# Patient Record
Sex: MALE | Race: OTHER | Employment: STUDENT | URBAN - METROPOLITAN AREA
[De-identification: names, ages, dates, MRNs, and addresses within clinical notes are randomized per-mention and may not be internally consistent; named-entity substitution may affect disease eponyms.]

---

## 2019-11-01 ENCOUNTER — APPOINTMENT (EMERGENCY)
Dept: RADIOLOGY | Facility: HOSPITAL | Age: 19
End: 2019-11-01
Payer: COMMERCIAL

## 2019-11-01 ENCOUNTER — HOSPITAL ENCOUNTER (EMERGENCY)
Facility: HOSPITAL | Age: 19
Discharge: HOME | End: 2019-11-01
Attending: EMERGENCY MEDICINE | Admitting: EMERGENCY MEDICINE
Payer: COMMERCIAL

## 2019-11-01 VITALS
SYSTOLIC BLOOD PRESSURE: 117 MMHG | HEART RATE: 96 BPM | DIASTOLIC BLOOD PRESSURE: 66 MMHG | OXYGEN SATURATION: 98 % | RESPIRATION RATE: 16 BRPM | TEMPERATURE: 97.9 F

## 2019-11-01 DIAGNOSIS — R07.89 ATYPICAL CHEST PAIN: Primary | ICD-10-CM

## 2019-11-01 LAB
ANION GAP SERPL CALCULATED.3IONS-SCNC: 4 MMOL/L (ref 4–13)
ATRIAL RATE: 96 BPM
BASOPHILS # BLD AUTO: 0.02 THOUSANDS/ΜL (ref 0–0.1)
BASOPHILS NFR BLD AUTO: 0 % (ref 0–1)
BUN SERPL-MCNC: 16 MG/DL (ref 5–25)
CALCIUM SERPL-MCNC: 9.7 MG/DL (ref 8.3–10.1)
CHLORIDE SERPL-SCNC: 104 MMOL/L (ref 100–108)
CO2 SERPL-SCNC: 28 MMOL/L (ref 21–32)
CREAT SERPL-MCNC: 1.07 MG/DL (ref 0.6–1.3)
EOSINOPHIL # BLD AUTO: 0.06 THOUSAND/ΜL (ref 0–0.61)
EOSINOPHIL NFR BLD AUTO: 1 % (ref 0–6)
ERYTHROCYTE [DISTWIDTH] IN BLOOD BY AUTOMATED COUNT: 12.3 % (ref 11.6–15.1)
GFR SERPL CREATININE-BSD FRML MDRD: 99 ML/MIN/1.73SQ M
GLUCOSE SERPL-MCNC: 88 MG/DL (ref 65–140)
HCT VFR BLD AUTO: 47.7 % (ref 36.5–49.3)
HGB BLD-MCNC: 16.5 G/DL (ref 12–17)
IMM GRANULOCYTES # BLD AUTO: 0.01 THOUSAND/UL (ref 0–0.2)
IMM GRANULOCYTES NFR BLD AUTO: 0 % (ref 0–2)
LYMPHOCYTES # BLD AUTO: 1.84 THOUSANDS/ΜL (ref 0.6–4.47)
LYMPHOCYTES NFR BLD AUTO: 28 % (ref 14–44)
MCH RBC QN AUTO: 29.9 PG (ref 26.8–34.3)
MCHC RBC AUTO-ENTMCNC: 34.6 G/DL (ref 31.4–37.4)
MCV RBC AUTO: 87 FL (ref 82–98)
MONOCYTES # BLD AUTO: 0.69 THOUSAND/ΜL (ref 0.17–1.22)
MONOCYTES NFR BLD AUTO: 11 % (ref 4–12)
NEUTROPHILS # BLD AUTO: 3.86 THOUSANDS/ΜL (ref 1.85–7.62)
NEUTS SEG NFR BLD AUTO: 60 % (ref 43–75)
NRBC BLD AUTO-RTO: 0 /100 WBCS
P AXIS: 72 DEGREES
PLATELET # BLD AUTO: 192 THOUSANDS/UL (ref 149–390)
PMV BLD AUTO: 10.4 FL (ref 8.9–12.7)
POTASSIUM SERPL-SCNC: 3.8 MMOL/L (ref 3.5–5.3)
PR INTERVAL: 126 MS
QRS AXIS: 106 DEGREES
QRSD INTERVAL: 90 MS
QT INTERVAL: 338 MS
QTC INTERVAL: 427 MS
RBC # BLD AUTO: 5.51 MILLION/UL (ref 3.88–5.62)
SODIUM SERPL-SCNC: 136 MMOL/L (ref 136–145)
T WAVE AXIS: 57 DEGREES
TROPONIN I SERPL-MCNC: <0.02 NG/ML
VENTRICULAR RATE: 96 BPM
WBC # BLD AUTO: 6.48 THOUSAND/UL (ref 4.31–10.16)

## 2019-11-01 PROCEDURE — 93010 ELECTROCARDIOGRAM REPORT: CPT | Performed by: INTERNAL MEDICINE

## 2019-11-01 PROCEDURE — 99285 EMERGENCY DEPT VISIT HI MDM: CPT

## 2019-11-01 PROCEDURE — 99285 EMERGENCY DEPT VISIT HI MDM: CPT | Performed by: EMERGENCY MEDICINE

## 2019-11-01 PROCEDURE — 71046 X-RAY EXAM CHEST 2 VIEWS: CPT

## 2019-11-01 PROCEDURE — 80048 BASIC METABOLIC PNL TOTAL CA: CPT | Performed by: EMERGENCY MEDICINE

## 2019-11-01 PROCEDURE — 85025 COMPLETE CBC W/AUTO DIFF WBC: CPT | Performed by: EMERGENCY MEDICINE

## 2019-11-01 PROCEDURE — 84484 ASSAY OF TROPONIN QUANT: CPT | Performed by: EMERGENCY MEDICINE

## 2019-11-01 PROCEDURE — 36415 COLL VENOUS BLD VENIPUNCTURE: CPT | Performed by: EMERGENCY MEDICINE

## 2019-11-01 PROCEDURE — 93005 ELECTROCARDIOGRAM TRACING: CPT

## 2019-11-01 NOTE — ED ATTENDING ATTESTATION
11/1/2019  Zoraida Horowitz DO, saw and evaluated the patient  I have discussed the patient with the resident/non-physician practitioner and agree with the resident's/non-physician practitioner's findings, Plan of Care, and MDM as documented in the resident's/non-physician practitioner's note, except where noted  All available labs and Radiology studies were reviewed  I was present for key portions of any procedure(s) performed by the resident/non-physician practitioner and I was immediately available to provide assistance  At this point I agree with the current assessment done in the Emergency Department  I have conducted an independent evaluation of this patient a history and physical is as follows:    19 yo male c/o L axillary chest pain intermittently starting last week  Has been doing more upper extremity exercises at the gym over the last couple weeks as well  Constant, no a/e factors, non radiating  Denies dyspnea, vomiting, diaphoresis, leg pain or swelling  No other c/o at this time  Imp: chest pain, likely MSK  Plan: ECG, CXR, trop        ED Course         Critical Care Time  Procedures

## 2019-11-01 NOTE — ED PROVIDER NOTES
History  Chief Complaint   Patient presents with    Chest Pain     Intermittent chest pain starting last Friday     20year-old male with previous medical history of anxiety presenting the emergency department with chest pain  Patient has had paroxysm is of chest pain since Friday  Patient notes that he has been working out more than usual over the last week and half  Patient notes onset of slightly sharp chest pain in the left axilla with associated symptoms of heaviness of the left arm  Patient denies any other associated symptoms, recent illness, dyspnea, nausea, vomiting, fatigue, abdominal pain, regular bowel movements, clotting disorders, family history of ACS, lower extremity edema, hemoptysis, cough  Patient denies history of hypertension, hypercholesterolemia, diabetes  Patient can think of no aggravating or alleviating factors  Chest Pain   Pain location:  L lateral chest  Pain quality: sharp    Pain radiates to:  Does not radiate  Pain radiates to the back: no    Pain severity:  Mild  Onset quality:  Sudden  Timing:  Rare  Progression:  Resolved  Chronicity:  New  Context: at rest    Context: not breathing, not lifting, no movement and not raising an arm    Relieved by:  Nothing  Worsened by:  Nothing tried  Ineffective treatments:  None tried      None       History reviewed  No pertinent past medical history  History reviewed  No pertinent surgical history  History reviewed  No pertinent family history  I have reviewed and agree with the history as documented  Social History     Tobacco Use    Smoking status: Never Smoker    Smokeless tobacco: Never Used   Substance Use Topics    Alcohol use: Never     Frequency: Never    Drug use: Never        Review of Systems   Cardiovascular: Positive for chest pain  All other systems reviewed and are negative        Physical Exam  ED Triage Vitals   Temperature Pulse Respirations Blood Pressure SpO2   11/01/19 1229 11/01/19 1229 11/01/19 1229 11/01/19 1229 11/01/19 1229   97 9 °F (36 6 °C) (!) 112 18 (!) 175/87 99 %      Temp Source Heart Rate Source Patient Position - Orthostatic VS BP Location FiO2 (%)   11/01/19 1229 11/01/19 1229 11/01/19 1229 11/01/19 1229 --   Oral Monitor Sitting Left arm       Pain Score       11/01/19 1300       No Pain             Orthostatic Vital Signs  Vitals:    11/01/19 1229 11/01/19 1300 11/01/19 1400   BP: (!) 175/87 146/89 117/66   Pulse: (!) 112 82 96   Patient Position - Orthostatic VS: Sitting Sitting        Physical Exam   Constitutional: He appears well-developed and well-nourished  No distress  HENT:   Head: Normocephalic and atraumatic  Right Ear: External ear normal    Left Ear: External ear normal    Eyes: Conjunctivae and EOM are normal    Neck: No JVD present  No tracheal deviation present  Cardiovascular: Normal rate, regular rhythm, normal heart sounds and intact distal pulses  No murmur heard  Pulmonary/Chest: Breath sounds normal  No stridor  No respiratory distress  He has no wheezes  He has no rales  Abdominal: Soft  Bowel sounds are normal  He exhibits no distension and no mass  There is no tenderness  There is no rebound and no guarding  Genitourinary:   Genitourinary Comments: Deferred   Musculoskeletal: He exhibits no edema, tenderness or deformity  Neurological: He exhibits normal muscle tone  Coordination normal    Skin: Skin is warm and dry  Capillary refill takes less than 2 seconds  No rash noted  He is not diaphoretic  No erythema  No pallor  Psychiatric: He has a normal mood and affect  His behavior is normal  Judgment and thought content normal    Nursing note and vitals reviewed        ED Medications  Medications - No data to display    Diagnostic Studies  Results Reviewed     Procedure Component Value Units Date/Time    Troponin I [[id number]]  (Normal) Collected:  11/01/19 1330    Lab Status:  Final result Specimen:  Blood from Arm, Left Updated:  11/01/19 1359     Troponin I <0 02 ng/mL     Basic metabolic panel [[id number]] Collected:  11/01/19 1330    Lab Status:  Final result Specimen:  Blood from Arm, Left Updated:  11/01/19 1358     Sodium 136 mmol/L      Potassium 3 8 mmol/L      Chloride 104 mmol/L      CO2 28 mmol/L      ANION GAP 4 mmol/L      BUN 16 mg/dL      Creatinine 1 07 mg/dL      Glucose 88 mg/dL      Calcium 9 7 mg/dL      eGFR 99 ml/min/1 73sq m     Narrative:       [place] guidelines for Chronic Kidney Disease (CKD):     Stage 1 with normal or high GFR (GFR > 90 mL/min/1 73 square meters)    Stage 2 Mild CKD (GFR = 60-89 mL/min/1 73 square meters)    Stage 3A Moderate CKD (GFR = 45-59 mL/min/1 73 square meters)    Stage 3B Moderate CKD (GFR = 30-44 mL/min/1 73 square meters)    Stage 4 Severe CKD (GFR = 15-29 mL/min/1 73 square meters)    Stage 5 End Stage CKD (GFR <15 mL/min/1 73 square meters)  Note: GFR calculation is accurate only with a steady state creatinine    CBC and differential [[id number]] Collected:  11/01/19 1330    Lab Status:  Final result Specimen:  Blood from Arm, Left Updated:  11/01/19 1341     WBC 6 48 Thousand/uL      RBC 5 51 Million/uL      Hemoglobin 16 5 g/dL      Hematocrit 47 7 %      MCV 87 fL      MCH 29 9 pg      MCHC 34 6 g/dL      RDW 12 3 %      MPV 10 4 fL      Platelets 932 Thousands/uL      nRBC 0 /100 WBCs      Neutrophils Relative 60 %      Immat GRANS % 0 %      Lymphocytes Relative 28 %      Monocytes Relative 11 %      Eosinophils Relative 1 %      Basophils Relative 0 %      Neutrophils Absolute 3 86 Thousands/µL      Immature Grans Absolute 0 01 Thousand/uL      Lymphocytes Absolute 1 84 Thousands/µL      Monocytes Absolute 0 69 Thousand/µL      Eosinophils Absolute 0 06 Thousand/µL      Basophils Absolute 0 02 Thousands/µL                  X-ray chest 2 views   ED Interpretation by Magy Smith DO (11/01 1409)   No acute abnormalities            Procedures  ECG 12 Lead Documentation Only  Date/Time: 11/1/2019 1:15 PM  Performed by: Matthew Newby DO  Authorized by: Matthew Newby DO     Indications / Diagnosis:  Chest pain  ECG reviewed by me, the ED Provider: yes    Patient location:  ED  Previous ECG:     Previous ECG:  Unavailable  Interpretation:     Interpretation: non-specific    Rate:     ECG rate:  96    ECG rate assessment: normal    Rhythm:     Rhythm: sinus rhythm    Ectopy:     Ectopy: none    QRS:     QRS axis:  Right    QRS intervals:  Normal  Conduction:     Conduction: normal    ST segments:     ST segments:  Non-specific    Depression:  II, V4, V5 and V6  T waves:     T waves: normal    Comments:      Possible RVH, by bottle peak what on the P wave in V2  High voltage  ED Course  ED Course as of Nov 01 1439 Fri Nov 01, 2019   1408 Troponin I: <0 02         HEART Risk Score      Most Recent Value   History  0 Filed at: 11/01/2019 1436   ECG  0 Filed at: 11/01/2019 1436   Age  0 Filed at: 11/01/2019 1436   Risk Factors  0 Filed at: 11/01/2019 1436   Troponin  0 Filed at: 11/01/2019 1436   Heart Score Risk Calculator   History  0 Filed at: 11/01/2019 1436   ECG  0 Filed at: 11/01/2019 1436   Age  0 Filed at: 11/01/2019 1436   Risk Factors  0 Filed at: 11/01/2019 1436   Troponin  0 Filed at: 11/01/2019 1436   HEART Score  0 Filed at: 11/01/2019 1436   HEART Score  0 Filed at: 11/01/2019 1436                            MDM  Number of Diagnoses or Management Options  Atypical chest pain: new and requires workup  Diagnosis management comments: 48-year-old male presenting emergency department with atypical chest pain that is paroxysmal in nature, non reproduce on examination, without dyspnea or any other associated symptoms  Differential diagnosis includes atypical chest pain, ACS less likely, PE less likely as no history of contributing factors, pneumonia, costochondritis  Patient is not not tachycardic on my examination    ECG shows a rightward axis, and nonspecific repolarization abnormalities  patient is slim and this could be physiological in nature  No previous ECG  Low suspicion for ACS  Heart score of 1  Patient has follow-up scheduled on Monday  Normal chest x-ray and blood work  Will have patient follow up with primary care provider as scheduled on Monday  Patient given strict return precautions for increasing symptoms  Patient expressed understanding with these return precautions  Amount and/or Complexity of Data Reviewed  Clinical lab tests: ordered and reviewed  Tests in the radiology section of CPT®: ordered and reviewed  Decide to obtain previous medical records or to obtain history from someone other than the patient: yes  Review and summarize past medical records: yes  Independent visualization of images, tracings, or specimens: yes    Risk of Complications, Morbidity, and/or Mortality  Presenting problems: moderate  Diagnostic procedures: moderate  Management options: moderate    Patient Progress  Patient progress: stable      Disposition  Final diagnoses:   Atypical chest pain     Time reflects when diagnosis was documented in both MDM as applicable and the Disposition within this note     Time User Action Codes Description Comment    11/1/2019  2:25 PM Latricia Del Real [[id number]] Atypical chest pain       ED Disposition     ED Disposition Condition Date/Time Comment    Discharge Good Fri Nov 1, 2019  2:25 PM Ramon Munoz discharge to home/self care  Follow-up Information     Follow up With Specialties Details Why Contact Info Additional Information    PCP  In 3 days as scheduled      [address] [place] [address] Emergency Department Emergency Medicine  If symptoms worsen, or any other concerning symptom as we discussed   [address] [place] [address], [id number] [phone]          Patient's Medications    No medications on file No discharge procedures on file  ED Provider  Attending physically available and evaluated Abbi Garcia I managed the patient along with the ED Attending      Electronically Signed by         Marlon Goldberg, DO  11/01/19 8363

## 2019-11-01 NOTE — DISCHARGE INSTRUCTIONS
Come back to the emergency department if you have increasing chest pain, nausea, vomiting, new symptoms with her chest pain, exertional chest pain as we discussed previously